# Patient Record
(demographics unavailable — no encounter records)

---

## 2024-11-20 NOTE — REASON FOR VISIT
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  She underwent L3-L5 fusion with Dr. DEVIN Rubio at \[facility]\"" on 1/22/24.  She had ? umbilical hernia.  She has a history of dyslipidemia, hypertension, angioedema with ACE inhibitor's (Accupril ); degenerative joint disease.  She is s/p right ankle replacement, Lt and Rt TKR.    History of significant myalgias with pravastatin.  She started rosuvastatin 5 mg daily and dropped to QOD due to cramping.  Subsequently, she stopped taking all statins with no improvement in her symptoms.  Leg cramps occur most often after she has been sitting for a while.  Not really related to exertion.   She tried to follow low-fat low-cholesterol diet.  She had repeat lab work on 5/29/2024 which showed her LDL increased from 63 to 138.    CTA of coronary arteries 8/23 with nonobstructive CAD.   EKG with NSR, LVH, IWMI of indeterminant age.  Chronic finding.   Unable to exercise due to orthopedic limitations.  She denies PND, orthopnea, diaphoresis, dizziness, palpitation, pedal edema.  She has muscle cramps and she has stopped taking tramadol completely.    4/21/21  Echo - normal LVEF without valvulopathy 4/21/21  Lexiscan - normal perfusion study.  Denies prior history of CHF, MI, syncope.

## 2024-11-20 NOTE — DISCUSSION/SUMMARY
[FreeTextEntry1] : To review, Diane is a very pleasant 74 y/o F with a PMH as noted above and the following active issues.    Nonobstructive CAD:  No active CP.  Continue ASA and statin therapy.  Goal LDL <70.  Hyperlipidemia:  Not well controlled.  Extensive discussion regarding LDL management to prevent worsening CAD.  Patient verbalizes the need for treatment as she now sees that despite following a low-fat low-cholesterol diet, her LDL increased significantly.  She was on rosuvastatin 5 mg daily and subsequently 5mg QOD with continued cramping, but no change in frequency or intensity with DC med.  Will attempt low-dose atorvastatin.  She thinks she may have been on that many years ago but was too expensive as generic was not available.  She will try drinking tonic water for her leg cramps.  If unable to tolerate atorvastatin, change her to PCSK9i or Nexlizet.  Repeat LDL in 6-8 weeks.  F/U with neurosurgeon to further evaluate cramping and leg weakness since her back surgery.   HTN:  Better controlled.  Her pain has improved.  Continue current meds.   F/U  in 6  months.

## 2024-11-20 NOTE — PHYSICAL EXAM
[Normal Appearance] : normal appearance [Normal Conjunctiva] : the conjunctiva exhibited no abnormalities [Eyelids - No Xanthelasma] : the eyelids demonstrated no xanthelasmas [No Oral Pallor] : no oral pallor [No Oral Cyanosis] : no oral cyanosis [Normal Jugular Venous V Waves Present] : normal jugular venous V waves present [Respiration, Rhythm And Depth] : normal respiratory rhythm and effort [Exaggerated Use Of Accessory Muscles For Inspiration] : no accessory muscle use [Auscultation Breath Sounds / Voice Sounds] : lungs were clear to auscultation bilaterally [Heart Rate And Rhythm] : heart rate and rhythm were normal [Heart Sounds] : normal S1 and S2 [Edema] : no peripheral edema present [Nail Clubbing] : no clubbing of the fingernails [Cyanosis, Localized] : no localized cyanosis [Petechial Hemorrhages (___cm)] : no petechial hemorrhages [] : no ischemic changes [Skin Color & Pigmentation] : normal skin color and pigmentation [Oriented To Time, Place, And Person] : oriented to person, place, and time [FreeTextEntry1] : Slow gait
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
## 2025-03-11 NOTE — DATA REVIEWED
[de-identified] : ACC: [id number] EXAM: CT BRAIN ORDERED BY: MEGAN OLMEDO  PROCEDURE DATE: 02/16/2025    INTERPRETATION: CLINICAL INFORMATION: stroke   5mm axial sections of the brain were obtained from base to vertex, without the intravenous administration of contrast material. Coronal and sagittal computer generated reconstructed views are available.  Comparison is made with prior CT of 2/15/2025 and demonstrates no significant interval change.   The ventricles and sulci are prominent consistent age appropriate involutional changes. Small vessel white matter ischemic changes are noted. There is no change in the small amount of subdural hemorrhage along the left tentorium. There is no midline shift. Bone window examination is unremarkable.  IMPRESSION: Age-appropriate involutional and ischemic gliotic changes. No change in left tentorial subdural hemorrhage compared with 2/15/2025.

## 2025-03-11 NOTE — REASON FOR VISIT
[New Patient Visit] : a new patient visit [Referred By: _________] : Patient was referred by FLORIDALMA [FreeTextEntry1] : left tentorial subdural hematoma
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
## 2025-03-11 NOTE — PHYSICAL EXAM
[General Appearance - Alert] : alert [General Appearance - In No Acute Distress] : in no acute distress [Oriented To Time, Place, And Person] : oriented to person, place, and time [Impaired Insight] : insight and judgment were intact [Affect] : the affect was normal [Person] : oriented to person [Place] : oriented to place [Cranial Nerves Trigeminal (V)] : facial sensation intact symmetrically [Cranial Nerves Facial (VII)] : face symmetrical [Motor Tone] : muscle tone was normal in all four extremities [Motor Strength] : muscle strength was normal in all four extremities [Sensation Tactile Decrease] : light touch was intact [Sensation Pain / Temperature Decrease] : pain and temperature was intact
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
